# Patient Record
(demographics unavailable — no encounter records)

---
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
## 2024-11-25 NOTE — DATA REVIEWED
[FreeTextEntry1] : 11/25 Bilateral lower extremity venous ultrasound: bilateral deep CFV reflux, left - no dvt, svt, rigth, GSV closed from PT to Prox calf, mid and distal calf with > 3 sec reflux with incompetent branches 4.7x2.9.  baker's cyst 1.4cm x 2.9 cm.

## 2024-11-25 NOTE — PHYSICAL EXAM
[2+] : left 2+ [FreeTextEntry1] : 2 + edema right skin changes include: hyperpigmentation in the gator area, lipodermatosclerosis, hyperkeratosis varicosities right (2.9-4.7) No ulcer CEAP classification C4b Palpable DP pulses bilaterally

## 2024-11-25 NOTE — PLAN
[TextEntry] : Patient is a candidate for endovenous chemical ablation treatment of the below the knee GSV.  The risks, benefits and alternatives treatment versus continued conservative management were discussed with the patient.  Patient wants to discuss with PCP Dr. Daugherty if he should proceed.  I have reviewed tests with patient.  Medical grade compression stockings 20-30 mmHG to be worn daily and not at night. Leg elevation Exercise and weight loss Over the counter pain medication for discomfort I have spent a total of 30 minutes with the patient and coordinating care.

## 2024-11-25 NOTE — ASSESSMENT
[FreeTextEntry1] : Mr. ANALI LIRA is a 71 year old with persistent right lower extremity venous insufficiency, CEAP classification C4b.  Patient has complaints of worsening leg discomfort with swelling, fatigue, heaviness. Patient has intact pulses in both legs without evidence of arterial insufficiency.

## 2024-12-10 NOTE — PHYSICAL EXAM

## 2024-12-10 NOTE — HISTORY OF PRESENT ILLNESS
[FreeTextEntry1] : This is a 70 y/o male with a pmhx of CAD DM HTN HLD who presents to the office for routine follow up. Patient feels well overall.  Patient was seen by vascular for venous insufficiency, discussed ablations. Wears compression stockings. Venous doppler 11/2024 with no DVT B/L, RLE venous insufficiency noted. Right Baker cyst noted.  Patient follows with [facility], follows yearly. Patient follows with ENT, Dr. Lewis, was told he has post nasal drop and deviated septum.  Patient denies chest pain, dyspnea, palpitations, dizziness, syncope, changes in bowel/bladder habits or appetite.

## 2025-01-27 NOTE — HISTORY OF PRESENT ILLNESS
[FreeTextEntry1] : Dear Froy,   I had the pleasure of seeing your patient ANALI LIRA for Cardiometabolic evaluation.   As you know, he  is a Pleasant, 71 year old with a past medical history of Obesity, Hyperlipidemia, Pre-T2DM, ASCVD =============== =============== Obesity, Hyperlipidemia, Pre-T2DM, ASCVD - Continue Repatha - Discussed diet and exercise at length - Start GLP1RA - Check Lpa/CRP - We discussed diet/exercise to be followed by nutritional counseling by an appointment to be made with our RD at the lipid center. - Discussed risks/benefits of GLP1RA and strategy for very gradual discontinuation of medication **[facility] per Dr. Daugherty** ----------------- ----------------- 1-2025 CC: Heart Issues - Notes No CP/SOB/Palps/Leg swelling - Reports No F/C/N/V/Headaches - Reports Normal Exercise Tolerance - Reports no medication changes - Reports normal mood/quality of life - Reports no diet changes - Reports no body aches - Reports no recent colds/viruses - Recent labs/imaging reviewed - Relevant Family history reviewed Mother/Father - CV Risk Assessment for 10 Year ACC/AHA Pooled Risk Cohort Equation places this person at > 7.5% Risk of ASCVD

## 2025-01-27 NOTE — DISCUSSION/SUMMARY
[FreeTextEntry1] : In summary   Keyla, 71 year old with a past medical history of Obesity, Hyperlipidemia, Pre-T2DM, ASCVD =============== =============== Obesity, Hyperlipidemia, Pre-T2DM, ASCVD - Continue Repatha - Discussed diet and exercise at length - Start GLP1RA - Check Lpa/CRP - We discussed diet/exercise to be followed by nutritional counseling by an appointment to be made with our RD at the lipid center. - Discussed risks/benefits of GLP1RA and strategy for very gradual discontinuation of medication **[facility] per Dr. Daugherty**   Cholesterol management - Assessed - Impression is active; changes as per above - Discussed diet and exercise at length - Any lifestyle/medication changes as per above   Risk factors for cardiomyopathy - Assessed - Impression is stable - Reviewed records from PCP   BP - Assessed - Impression is active   Cardiac Health optimization - Discussed diet and exercise at length - Discussed importance of monitoring and re-assessment of cardiac health on further visits        Froy, kind thanks for the referral.   Mauro Vasquez MD [facility] T[facility]LL [facility] Director, Preventive Cardiology & Lipidology [facility] [place]                                                                                                                                                                                                                                                                     --                                                                                                                                                                                                                                                                                                                                                                                                                                                                                                                                                                                                                                                                                                                            -----------

## 2025-01-27 NOTE — DISCUSSION/SUMMARY
[FreeTextEntry1] : In summary   Keyla, 71 year old with a past medical history of Obesity, Hyperlipidemia, Pre-T2DM, ASCVD =============== =============== Obesity, Hyperlipidemia, Pre-T2DM, ASCVD - Continue Repatha - Discussed diet and exercise at length - Start GLP1RA - Check Lpa/CRP - We discussed diet/exercise to be followed by nutritional counseling by an appointment to be made with our RD at the lipid center. - Discussed risks/benefits of GLP1RA and strategy for very gradual discontinuation of medication **[facility] per Dr. Daugherty**   Cholesterol management - Assessed - Impression is active; changes as per above - Discussed diet and exercise at length - Any lifestyle/medication changes as per above   Risk factors for cardiomyopathy - Assessed - Impression is stable - Reviewed records from PCP   BP - Assessed - Impression is active   Cardiac Health optimization - Discussed diet and exercise at length - Discussed importance of monitoring and re-assessment of cardiac health on further visits        Froy, kind thanks for the referral.   Mauro Vasquez MD [facility] TERELL [facility] Director, Preventive Cardiology & Lipidology [facility] [place]                                                                                                                                                                                                                                                                     --                                                                                                                                                                                                                                                                                                                                                                                                                                                                                                                                                                                                                                                                                                                            -----------

## 2025-02-14 NOTE — DISCUSSION/SUMMARY
[FreeTextEntry1] : In summary   Keyla, 71 year old with a past medical history of Obesity, Hyperlipidemia, Pre-T2DM, ASCVD =============== =============== Obesity, Hyperlipidemia, Pre-T2DM, ASCVD - Continue Repatha - Discussed diet and exercise at length - Start GLP1RA - Check Lpa/CRP - We discussed diet/exercise to be followed by nutritional counseling by an appointment to be made with our RD at the lipid center. - Discussed risks/benefits of GLP1RA and strategy for very gradual discontinuation of medication **[facility] per Dr. Daugherty**   Cholesterol management - Assessed - Impression is active; changes as per above - Discussed diet and exercise at length - Any lifestyle/medication changes as per above   Risk factors for cardiomyopathy - Assessed - Impression is stable - Reviewed records from PCP   BP - Assessed - Impression is active   Cardiac Health optimization - Discussed diet and exercise at length - Discussed importance of monitoring and re-assessment of cardiac health on further visits        Froy, kind thanks for the referral.   Mauro Vasquez MD [facility] TERELL [facility] Director, Preventive Cardiology & Lipidology [facility] [place]                                                                                                                                                                                                                                                                     --                                                                                                                                                                                                                                                                                                                                                                                                                                                                                                                                                                                                                                                                                                                            -----------

## 2025-04-15 NOTE — HISTORY OF PRESENT ILLNESS
[FreeTextEntry1] : ANALI is a 71 year M w/MHx of MI, CAD s/p PCI, IVC filter s/p provoked thromboembolism, Dilated Aorta, Venous insufficiency, Hepatic steatosis, HLD, HTN, T2DM, RON, Obesity who presents for follow up. Last OV 12/10/2024. Saw Dr. Vasquez, unable to obtain GLP1RA due to cost. Denies chest pain, palpitations, diaphoresis, vision changes, HA, dizziness, syncope, cough, wheezing, edema, fatigue, fever, chills, infection/UTI SXS. Followed by [facility]. Does note increased MYERS.

## 2025-05-09 NOTE — PHYSICAL EXAM
[No Acute Distress] : no acute distress [Normal Oropharynx] : normal oropharynx [III] : Mallampati Class: III [Normal Appearance] : normal appearance [Supple] : supple [No Neck Mass] : no neck mass [No JVD] : no jvd [Normal Rate/Rhythm] : normal rate/rhythm [Normal S1, S2] : normal s1, s2 [No Murmurs] : no murmurs [No Rubs] : no rubs [No Gallops] : no gallops [No Resp Distress] : no resp distress [No Acc Muscle Use] : no acc muscle use [Normal Palpation] : normal palpation [Normal Rhythm and Effort] : normal rhythm and effort [Clear to Auscultation Bilaterally] : clear to auscultation bilaterally [Normal to Percussion] : normal to percussion [No Abnormalities] : no abnormalities [Benign] : benign [Normal Gait] : normal gait [No Clubbing] : no clubbing [No Cyanosis] : no cyanosis [No Edema] : no edema [FROM] : FROM [Normal Color/ Pigmentation] : normal color/ pigmentation [No Focal Deficits] : no focal deficits [Oriented x3] : oriented x3 [Normal Affect] : normal affect [TextBox_2] : Overweight [TextBox_44] : Short neck with increased circumference

## 2025-05-09 NOTE — HISTORY OF PRESENT ILLNESS
[Never] : never [TextBox_4] : [employer] 911 referral [place] 9/11 exposure Diagnosis Severe obstructive sleep apnea Patient is on ResMed CPAP device Patient states he cannot sleep without the CPAP device

## 2025-05-09 NOTE — REVIEW OF SYSTEMS
[Negative] : Psychiatric [TextBox_3] : Overweight [TextBox_14] : Nasal septal deviation [TextBox_44] : Right bundle branch block, hyperlipidemia [TextBox_91] : Venous insufficiency, history incomplete tear left rotator cuff, lumbar radiculopathy [TextBox_141] : Prediabetes

## 2025-05-09 NOTE — DISCUSSION/SUMMARY
[FreeTextEntry1] : [place] 9/11 Very severe obstructive sleep apnea Investigated with vendor patient is not due for a new CPAP device until May 2027 and at that time Patient will require a new formal 2 night home sleep study Will then order new CPAP device Patient compliant with CPAP therapy.  Continue present settings.  Patient with demonstrated clinical benefit with daytime and nocturnal symptomatology improvement.

## 2025-05-09 NOTE — PROCEDURE
[FreeTextEntry1] : Sleep study May 6 May 2022 Very severe obstructive sleep apnea AHI 67 Percent time less than 90% 29% Percent time snoring greater than 30 dB 49%   CPAP data compliance through May 8, 2025 Usage 100% Hours greater than 8 AutoSet CPAP 5-18 cm H2O AHI 2.6

## 2025-05-14 NOTE — HISTORY OF PRESENT ILLNESS
[FreeTextEntry1] : ANALI is a 72 year M w/MHx of MI, CAD s/p PCI, IVC filter s/p provoked thromboembolism, Dilated Aorta, Venous insufficiency, Hepatic steatosis, HLD, HTN, T2DM, RON, Obesity who presents for hospital follow up. 4/17/2025 Coronary CTA [place] 3228, 96th percentile, 4/22/2025 s/p LHC @ [facility], notable for small RCA w/ . Since last visit, Jardiance 10 mg daily was started. Denies ADR. Does note some fatigue. Denies chest pain, palpitations, diaphoresis, vision changes, HA, dizziness, syncope, cough, wheezing, SOB/MYERS, edema, fever, chills, infection/UTI SXS.

## 2025-05-14 NOTE — HISTORY OF PRESENT ILLNESS
[FreeTextEntry1] : Dear Froy,   I had the pleasure of seeing your patient ANALI LIRA for Cardiometabolic evaluation.   As you know, he  is a Pleasant, 71 year old with a past medical history of Obesity, Hyperlipidemia, Pre-T2DM, ASCVD =============== =============== Obesity, Hyperlipidemia, Pre-T2DM, ASCVD - Continue Repatha - Discussed diet and exercise at length - Start GLP1RA - Check Lpa/CRP - We discussed diet/exercise to be followed by nutritional counseling by an appointment to be made with our RD at the lipid center. - Discussed risks/benefits of GLP1RA and strategy for very gradual discontinuation of medication **[facility] per Dr. Daugherty** ----------------- ----------------- 1-2025 CC: Heart Issues - Notes No CP/SOB/Palps/Leg swelling - Reports No F/C/N/V/Headaches - Reports Normal Exercise Tolerance - Reports no medication changes - Reports normal mood/quality of life - Reports no diet changes - Reports no body aches - Reports no recent colds/viruses - Recent labs/imaging reviewed - Relevant Family history reviewed Mother/Father - CV Risk Assessment for 10 Year ACC/AHA Pooled Risk Cohort Equation places this person at > 7.5% Risk of ASCVD ---------------------------------------------------------------------------- ---------------------------------------------------------------------------- 5-2025 CC: Heart Issues - Patient reports no chest pain, no localization to the sternum, no radiation to the neck/jaw, no alleviating nor worsening precipitants to CP, no assoc symptoms to CP - Patient notes no associated SOB/Palps/Leg swelling - Reports No associated F/C/N/V/Headaches - Reports Normal Exercise Tolerance - Reports no medication changes - Reports normal mood/quality of life - Reports no associated midnight awakenings from cP - Reports no diet changes - Reports no associated body aches- Reports no recent colds/viruses- Recent labs/imaging reviewed- Relevant Family history reviewed Mother/Father - CVRisk Assessment for 10 Year ACC/AHA Pooled Risk Cohort Equation places this person at < 7.5% Risk of ASCVD  Start Zepbound 2.5 mg  Has Per Sleep Study Very Severe Sleep Apnea, Pre-T2DM, And Known A1c Elevation  DESPITE Medicare, he has a [employer] Plan

## 2025-05-14 NOTE — PHYSICAL EXAM

## 2025-05-14 NOTE — DISCUSSION/SUMMARY
[FreeTextEntry1] : In summary   Keyla, 71 year old with a past medical history of Obesity, Hyperlipidemia, Pre-T2DM, ASCVD =============== =============== Obesity, Hyperlipidemia, Pre-T2DM, ASCVD - Continue Repatha - Discussed diet and exercise at length - Cont GLP1RA - Check Lpa/CRP - We discussed diet/exercise to be followed by nutritional counseling by an appointment to be made with our RD at the lipid center. - Discussed risks/benefits of GLP1RA and strategy for very gradual discontinuation of medication **[facility] per Dr. Daugherty**   Cholesterol management - Assessed - Impression is active; changes as per above - Discussed diet and exercise at length - Any lifestyle/medication changes as per above   Risk factors for cardiomyopathy - Assessed - Impression is stable - Reviewed records from PCP   BP - Assessed - Impression is active   Cardiac Health optimization - Discussed diet and exercise at length - Discussed importance of monitoring and re-assessment of cardiac health on further visits        Froy, kind thanks for the referral.   Mauro Vasquez MD [facility] TERELL DU Director, Preventive Cardiology & Lipidology [facility] [place]                                                                                                                                                                                                                                                                     --                                                                                                                                                                                                                                                                                                                                                                                                                                                                                                                                                                                                                                                                                                                            -----------

## 2025-06-13 NOTE — PHYSICAL EXAM
[Normal Breath Sounds] : Normal breath sounds [Normal Heart Sounds] : normal heart sounds [Alert] : alert [Oriented to Person] : oriented to person [Oriented to Place] : oriented to place [Oriented to Time] : oriented to time [Calm] : calm [No HSM] : no hepatosplenomegaly [de-identified] : WNL [de-identified] : WNL [de-identified] : ALEXEYL [de-identified] : Obese, soft, nontender, well-healed right subcostal incision, well-healed umbilical incision without incisional hernias.  There is no palpable left inguinal or right inguinal hernia.  There is no tenderness in either groin. [de-identified] : WNL [de-identified] : WNL

## 2025-06-13 NOTE — CONSULT LETTER
[Dear  ___] : Dear  [unfilled], [Consult Letter:] : I had the pleasure of evaluating your patient, [unfilled]. [Please see my note below.] : Please see my note below. [Consult Closing:] : Thank you very much for allowing me to participate in the care of this patient.  If you have any questions, please do not hesitate to contact me. [Sincerely,] : Sincerely, [FreeTextEntry3] : Mason Juan M.D., F.A.C.S, F.A.S.C.R.S

## 2025-06-13 NOTE — HISTORY OF PRESENT ILLNESS
[de-identified] : J Luis is a 73 y/o male being seen for consultation, inguinal hernia  s/p inguinal hernia repair by Dr. Juan in 2013   US Joint Extremities 5/14/25 - Small to moderate-sized fat-containing bilateral inguinal hernias.  US abdomen 5/21/25 (Elevated creatinine) - Bilateral renal cysts. Otherwise, the kidneys are unremarkable. There is no hydronephrosis. Moderate to marked diffuse hepatic steatosis. No focal hepatic lesion is seen.  Today patient reports feeling well no pain or bulges in his bilateral groin.   Regular BMs twice daily.  No nausea or vomiting.  Denies fever or chills.  Patient is on aspirin for stents x2 last placed in April this year.

## 2025-06-13 NOTE — PHYSICAL EXAM
[Normal Breath Sounds] : Normal breath sounds [Normal Heart Sounds] : normal heart sounds [Alert] : alert [Oriented to Person] : oriented to person [Oriented to Place] : oriented to place [Oriented to Time] : oriented to time [Calm] : calm [No HSM] : no hepatosplenomegaly [de-identified] : WNL [de-identified] : WNL [de-identified] : ALEXEYL [de-identified] : Obese, soft, nontender, well-healed right subcostal incision, well-healed umbilical incision without incisional hernias.  There is no palpable left inguinal or right inguinal hernia.  There is no tenderness in either groin. [de-identified] : WNL [de-identified] : WNL

## 2025-06-13 NOTE — HISTORY OF PRESENT ILLNESS
[de-identified] : J Luis is a 73 y/o male being seen for consultation, inguinal hernia  s/p inguinal hernia repair by Dr. Juan in 2013   US Joint Extremities 5/14/25 - Small to moderate-sized fat-containing bilateral inguinal hernias.  US abdomen 5/21/25 (Elevated creatinine) - Bilateral renal cysts. Otherwise, the kidneys are unremarkable. There is no hydronephrosis. Moderate to marked diffuse hepatic steatosis. No focal hepatic lesion is seen.  Today patient reports feeling well no pain or bulges in his bilateral groin.   Regular BMs twice daily.  No nausea or vomiting.  Denies fever or chills.  Patient is on aspirin for stents x2 last placed in April this year.

## 2025-07-03 NOTE — HISTORY OF PRESENT ILLNESS
[FreeTextEntry1] : This is a 72 y.o. M w/ PMHx of MI, CAD s/p PCI, IVC filter s/p provoked thromboembolism, Dilated Aorta, Venous insufficiency, Hepatic steatosis, HLD, HTN, T2DM, RON, Obesity who presents for follow-up. Last OV 05/14/2025, s/p LHC @ [facility] notable for small RCA w/ . Also was recently started on Jardiance 10mg daily. Patient reported localized edema. Venous Duplex 05/14/2025: No evidence of right and left lower extremity thrombosis in the deep veins. Enlarged lymph nodes noted in the bilateral groin. Incidental Findings: Baker's cyst was seen in the right popliteal fossa.  US limited b/l extremity 05/19/2025: Small to moderate-sized fat-containing bilateral inguinal hernias. Patient advised to see Dr. Juan for further evaluation and management of b/l inguinal hernias.  Elevated Creatinine noted on recent labs. Advised for to see nephrology Dr. Vazquez  US Abdomen 05/21/2025: Bilateral renal cysts. otherwise, the kidneys are unremarkable. No hydronephrosis. Moderate to marked diffuse hepatic steatosis. No focal hepatic lesion is seen. MRI Abdomen MRI elastography liver with and without IV contrast: Liver stiffness of 2.9 kPa which may represent normal liver or inflammation (including steatohepatitis). Moderate hepatic steatosis with a fat fraction of 20.1 %. No hepatic iron overload with an estimated liver iron concentration of 0.785 mg Fe/g No concerning hepatic mass. Renal cysts as described. Following with GI, Dr. Aguiar.   Patient reports chronic lower back pain that has gotten worse over the last couple of months. Patient reports b/l hand numbness and tingling.  Denies chest pain, palpitations, diaphoresis, vision changes, HA, dizziness, syncope, cough, wheezing, edema, fever, chills, infection.

## 2025-07-14 NOTE — END OF VISIT
[FreeTextEntry3] : This note was written by Masoud Quintana on 07/14/2025 acting solely as a scribe for Dr. Raffi Corona.   All medical record entries made by the Scribe were at my, Dr. Raffi Corona, direction and personally dictated by me on 07/14/2025. I have personally reviewed the chart and agree that the record accurately reflects my personal performance of the history, physical exam, assessment and plan.

## 2025-07-14 NOTE — DISCUSSION/SUMMARY
[FreeTextEntry1] :  He has findings consistent with bilateral carpal tunnel syndrome.  I had a discussion with the patient regarding today's visit, the prognosis of this diagnosis, and treatment recommendations and options.  At this time, I recommended an EMG to evaluate for bilateral carpal tunnel syndrome. I also recommended bracing. Bilateral carpal tunnel splints were provided today. He will follow up after his EMG to review the results and discuss treatment recommendations.  The patient has agreed to the above plan of management and has expressed full understanding.  All questions were fully answered to the patient's satisfaction.  My cumulative time spent on this visit included: Preparation for the visit, review of the medical records, review of pertinent diagnostic studies, examination and counseling of the patient on the above diagnosis, treatment plan and prognosis, orders of diagnostic tests, medication and/or appropriate procedures and documentation in the medical records of today's visit.

## 2025-07-14 NOTE — REVIEW OF SYSTEMS
[Right] : right [Negative] : Allergic/Immunologic [FreeTextEntry5] : see HPI [de-identified] : see HPI [de-identified] : see HPI

## 2025-07-14 NOTE — PHYSICAL EXAM
[de-identified] : - Constitutional: This is a male in no obvious distress.   - Psych: Patient is alert and oriented to person, place and time.  Patient has a normal mood and affect.   Examination of both hands demonstrates no obvious thenar atrophy.  He has intact sensation to light touch bilaterally along the radial, ulnar median nerve distributions.  Provocative signs for carpal tunnel syndrome are negative bilaterally.  There is no evidence of a trigger finger.

## 2025-07-14 NOTE — ADDENDUM
[FreeTextEntry1] :  I, Masoud Quintana, acted solely as a scribe for Dr. Corona on this date on 07/14/2025.